# Patient Record
Sex: MALE | Race: WHITE | URBAN - METROPOLITAN AREA
[De-identification: names, ages, dates, MRNs, and addresses within clinical notes are randomized per-mention and may not be internally consistent; named-entity substitution may affect disease eponyms.]

---

## 2023-01-18 ENCOUNTER — TRANSCRIBE ORDER (OUTPATIENT)
Dept: SCHEDULING | Age: 53
End: 2023-01-18

## 2023-01-18 DIAGNOSIS — S56.519A PARTIAL TEAR OF COMMON EXTENSOR TENDON OF ELBOW: Primary | ICD-10-CM

## 2023-01-18 DIAGNOSIS — M77.12 LATERAL EPICONDYLITIS OF LEFT ELBOW: ICD-10-CM

## 2023-03-22 ENCOUNTER — HOSPITAL ENCOUNTER (OUTPATIENT)
Dept: MRI IMAGING | Age: 53
Discharge: HOME | End: 2023-03-22
Attending: ORTHOPAEDIC SURGERY
Payer: OTHER GOVERNMENT

## 2023-03-22 DIAGNOSIS — S56.519A PARTIAL TEAR OF COMMON EXTENSOR TENDON OF ELBOW: ICD-10-CM

## 2023-03-22 DIAGNOSIS — M77.12 LATERAL EPICONDYLITIS OF LEFT ELBOW: ICD-10-CM

## 2023-03-22 PROCEDURE — 73221 MRI JOINT UPR EXTREM W/O DYE: CPT

## 2023-03-29 ENCOUNTER — OFFICE VISIT (OUTPATIENT)
Dept: ORTHOPEDIC SURGERY | Age: 53
End: 2023-03-29

## 2023-03-29 DIAGNOSIS — M25.562 ACUTE PAIN OF LEFT KNEE: Primary | ICD-10-CM

## 2023-03-29 DIAGNOSIS — S83.242A ACUTE MEDIAL MENISCUS TEAR OF LEFT KNEE, INITIAL ENCOUNTER: ICD-10-CM

## 2023-03-29 NOTE — PROGRESS NOTES
Inocencio Pantoja (: 1970) is a 47 y.o. male, patient, here for evaluation of the following chief complaint(s):  Knee Pain (Left knee )       HPI:    Patient presents to the office today with a chief complaint of left knee pain. Patient describes discomfort that is mostly located along the inner aspect of the knee. He has had a couple episodes in which she is twisted his knee and has felt increasing pain pain is mostly located along the inner aspect of the knee. He has not noted any swelling. He states that when he moves straightahead his knee feels pretty stable but when he twists his when his knee gives way. Not on File    No current outpatient medications on file. No current facility-administered medications for this visit. No past medical history on file. No past surgical history on file. No family history on file. Social History     Socioeconomic History    Marital status:      Spouse name: Not on file    Number of children: Not on file    Years of education: Not on file    Highest education level: Not on file   Occupational History    Not on file   Tobacco Use    Smoking status: Not on file    Smokeless tobacco: Not on file   Substance and Sexual Activity    Alcohol use: Not on file    Drug use: Not on file    Sexual activity: Not on file   Other Topics Concern    Not on file   Social History Narrative    Not on file     Social Determinants of Health     Financial Resource Strain: Not on file   Food Insecurity: Not on file   Transportation Needs: Not on file   Physical Activity: Not on file   Stress: Not on file   Social Connections: Not on file   Intimate Partner Violence: Not on file   Housing Stability: Not on file       Review of Systems   Musculoskeletal:         Left knee     Vitals: There were no vitals taken for this visit. There is no height or weight on file to calculate BMI. Ortho Exam     Patient is alert and oriented x3.  Patient is in no acute distress. Patient ambulates with a nonantalgic gait. Left knee:  No effusion. There is no pain with manipulation of the patella. No crepitation with passive or active range of motion of the patella. Positive medial joint line tenderness. Positive medial Alistair's maneuver. There is no lateral joint line tenderness. Alistair's maneuvers negative for lateral joint line tenderness. Collateral ligaments are intact. Lachman's test negative. Anterior drawer and posterior drawer negative. There is no soft tissue swelling distally. Neurovascular examination is intact. Right knee: There is no abrasions, lacerations, ecchymosis or soft tissue swelling. No effusion is identified. There is no pain to palpation along the medial or lateral border of the patella. There is no pain or crepitation with manipulation of the patella. There is normal excursion of the patella. Patellar grind test is negative. Active and passive range of motion is full and does not cause pain or crepitation. There is no pain with palpation along the medial femoral epicondyle or medial tibia and no pain with palpation over the lateral femoral epicondyle. There is no medial or lateral joint line tenderness. Alistair's maneuver is negative. There is no collateral ligament instability. Anterior drawer, Lachman and posterior drawer are negative. There is no soft tissue swelling distally into the leg. Neurocirculatory examination is intact. XR Results (most recent):  Results from Appointment encounter on 03/29/23    XR KNEE LT MIN 4 V    Narrative  4 view x-ray of the left knee reveals no evidence of fracture or dislocation. Mild evidence of patellofemoral arthritis. No osteophyte noted along the medial aspect of the knee joint       ASSESSMENT/PLAN:    Patient presents the office with signs symptoms consistent with medial meniscus tear.   Given his recurrent symptoms and mechanical symptoms this may require surgical intervention. Therefore, I would suggest proceeding with an MRI evaluation.   He is to continue with ice and elevation and occasional modification of his activity and occasional use of over-the-counter anti-inflammatory medicines he is to return to the office after the Brandon Rivera MD

## 2023-04-27 ENCOUNTER — OFFICE VISIT (OUTPATIENT)
Dept: ORTHOPEDIC SURGERY | Age: 53
End: 2023-04-27
Payer: OTHER GOVERNMENT

## 2023-04-27 DIAGNOSIS — S83.242D ACUTE MEDIAL MENISCUS TEAR OF LEFT KNEE, SUBSEQUENT ENCOUNTER: Primary | ICD-10-CM

## 2023-04-27 PROCEDURE — 99214 OFFICE O/P EST MOD 30 MIN: CPT | Performed by: ORTHOPAEDIC SURGERY

## 2023-04-27 NOTE — PROGRESS NOTES
Noel Elias (: 1970) is a 47 y.o. male, patient, here for evaluation of the following chief complaint(s):  Knee Pain (Left knee )       HPI:    Patient resents the office today with recurrence of her left knee. States the pain is mostly located along the inner aspect of the knee    Not on File    No current outpatient medications on file. No current facility-administered medications for this visit. No past medical history on file. No past surgical history on file. No family history on file. Social History     Socioeconomic History    Marital status:      Spouse name: Not on file    Number of children: Not on file    Years of education: Not on file    Highest education level: Not on file   Occupational History    Not on file   Tobacco Use    Smoking status: Not on file    Smokeless tobacco: Not on file   Substance and Sexual Activity    Alcohol use: Not on file    Drug use: Not on file    Sexual activity: Not on file   Other Topics Concern    Not on file   Social History Narrative    Not on file     Social Determinants of Health     Financial Resource Strain: Not on file   Food Insecurity: Not on file   Transportation Needs: Not on file   Physical Activity: Not on file   Stress: Not on file   Social Connections: Not on file   Intimate Partner Violence: Not on file   Housing Stability: Not on file       Review of Systems   Musculoskeletal:         Left knee      Vitals: There were no vitals taken for this visit. There is no height or weight on file to calculate BMI. Ortho Exam       Patient is alert and oriented x3. Patient is in no acute distress. Patient ambulates with a nonantalgic gait. Left knee:  No effusion. There is no pain with manipulation of the patella. No crepitation with passive or active range of motion of the patella. Positive medial joint line tenderness. Positive medial Alistair's maneuver. There is no lateral joint line tenderness.   Alistair's maneuvers negative for lateral joint line tenderness. Collateral ligaments are intact. Lachman's test negative. Anterior drawer and posterior drawer negative. There is no soft tissue swelling distally. Neurovascular examination is intact. Right knee: There is no abrasions, lacerations, ecchymosis or soft tissue swelling. No effusion is identified. There is no pain to palpation along the medial or lateral border of the patella. There is no pain or crepitation with manipulation of the patella. There is normal excursion of the patella. Patellar grind test is negative. Active and passive range of motion is full and does not cause pain or crepitation. There is no pain with palpation along the medial femoral epicondyle or medial tibia and no pain with palpation over the lateral femoral epicondyle. There is no medial or lateral joint line tenderness. Alistair's maneuver is negative. There is no collateral ligament instability. Anterior drawer, Lachman and posterior drawer are negative. There is no soft tissue swelling distally into the leg. Neurocirculatory examination is intact. XR Results (most recent):  Results from Appointment encounter on 03/29/23     XR KNEE LT MIN 4 V     Narrative  4 view x-ray of the left knee reveals no evidence of fracture or dislocation. Mild evidence of patellofemoral arthritis. No osteophyte noted along the medial aspect of the knee joint        MRI evaluation of the left knee shows evidence of undersurface tearing medial meniscus. He also has cartilage degeneration that is noted is very isolated right along the mid flexion zone of the medial femur with mild edema in the subchondral plate. ASSESSMENT/PLAN:    Abnormal the MRI with the patient. I demonstrated the findings to them. We discussed treatment options moving forward. I gone over operative versus nonoperative management. He would like to consider surgical invention.   I explained to the patient is reasonable and he should do very well with partial medial meniscectomy. However, I am a little concerned in regards to the cartilage changes that are noted. He understands these may persist and actually get worse with time. He understands limitations of arthroscopy. I have gone over the risks. The risks and benefits were described to the patient. The patient understands there is a risk of infection, postoperative pain, numbness, tingling, stiffness DVT, PE, MI, CVA and any other unforeseen events. The patient also understands there is a long rehabilitative process that typically follows the surgical procedure. We talked about the possibility of not being able to alleviate all of the discomfort. Also, I explained  there is no guarantee all function and strength will return. The patient understands the possiblity that  implants may be utilized during this surgery. The patient also understands the generalized, associated risk of anesthetic and wishes to proceed in an elective fashion.         Bety Galdamez MD

## 2023-04-28 DIAGNOSIS — S83.242D ACUTE MEDIAL MENISCUS TEAR OF LEFT KNEE, SUBSEQUENT ENCOUNTER: Primary | ICD-10-CM
